# Patient Record
Sex: MALE | Race: WHITE | Employment: OTHER | URBAN - NONMETROPOLITAN AREA
[De-identification: names, ages, dates, MRNs, and addresses within clinical notes are randomized per-mention and may not be internally consistent; named-entity substitution may affect disease eponyms.]

---

## 2021-09-20 ENCOUNTER — OUTSIDE SERVICES (OUTPATIENT)
Dept: FAMILY MEDICINE CLINIC | Age: 81
End: 2021-09-20
Payer: MEDICARE

## 2021-09-20 VITALS
WEIGHT: 164.1 LBS | TEMPERATURE: 97.7 F | HEART RATE: 80 BPM | SYSTOLIC BLOOD PRESSURE: 125 MMHG | OXYGEN SATURATION: 97 % | DIASTOLIC BLOOD PRESSURE: 75 MMHG | RESPIRATION RATE: 16 BRPM

## 2021-09-20 DIAGNOSIS — I10 ESSENTIAL HYPERTENSION, BENIGN: ICD-10-CM

## 2021-09-20 DIAGNOSIS — K21.00 GASTROESOPHAGEAL REFLUX DISEASE WITH ESOPHAGITIS WITHOUT HEMORRHAGE: ICD-10-CM

## 2021-09-20 DIAGNOSIS — N40.1 BENIGN PROSTATIC HYPERPLASIA WITH LOWER URINARY TRACT SYMPTOMS, SYMPTOM DETAILS UNSPECIFIED: ICD-10-CM

## 2021-09-20 DIAGNOSIS — S72.332D CLOSED DISPLACED OBLIQUE FRACTURE OF SHAFT OF LEFT FEMUR WITH ROUTINE HEALING: Primary | ICD-10-CM

## 2021-09-20 DIAGNOSIS — E78.2 MIXED HYPERLIPIDEMIA: ICD-10-CM

## 2021-09-20 DIAGNOSIS — I48.20 CHRONIC ATRIAL FIBRILLATION (HCC): ICD-10-CM

## 2021-09-20 PROCEDURE — 99310 SBSQ NF CARE HIGH MDM 45: CPT | Performed by: NURSE PRACTITIONER

## 2021-09-20 ASSESSMENT — ENCOUNTER SYMPTOMS
COUGH: 0
CONSTIPATION: 0
WHEEZING: 0
EYE REDNESS: 0
VOMITING: 0
SORE THROAT: 0
SHORTNESS OF BREATH: 0
RHINORRHEA: 0
DIARRHEA: 0
NAUSEA: 0

## 2021-09-20 NOTE — PROGRESS NOTES
Babs Lemus is a 79 y.o. male who presents today for his medical conditions/complaints as noted below. Chief Complaint   Patient presents with    Other     Medication review           HPI:     Patient seen and examined today for medication review. Patient is an 65-year-old male who was admitted on 8/25/2021 after he fell and injured his left hip. He fell on his left side and complained of immediate pain he presented to the emergency department and was noted to have a left femoral neck subcapital fracture. Dr. Fatemeh Dumas was consulted for surgical intervention and he underwent a left total hip replacement on 8/27/2021. Postoperatively he is very soft-spoken and has not felt well per hospital records. CT of the head was completed on 8/28/2021 and revealed no acute intracranial hemorrhage or mass-effect. An abdominal x-ray was completed on 8/29/2021 and showed fecal retention. He did transfer to the inpatient rehab on 8/31/2021 and then came to the facility on 9/17/2021 for additional therapy. Patient has past medical history of atrial fib, hypertension, hyperlipidemia, diverticulitis, history of kidney stones, osteoarthritis, bilateral rotator cuffs, postoperative anemia. Patient's cardiac meds were changed in the hospital secondary to not being able to tolerate amiodarone. Patient was switched to Multaq and remained on metoprolol and Cardizem. Patient states he still does not feel well and does have a follow-up with cardiology on 9/23/2021. Patient also states that he does not want to remain on Eliquis as this is very expensive. Will have patient discuss with cardiology as he has been on a full aspirin in the past. Patient's heart rate is regular and rate controlled currently. Blood pressure is minimally low and will add parameters for metoprolol and Cardizem along with Multaq.       Past Medical History:   Diagnosis Date    Atrial fibrillation ([facility] [address]     Bilateral rotator cuff dysfunction     Diverticulitis     Essential hypertension     Kidney stones     Mixed hyperlipidemia     Osteoarthritis     UTI (urinary tract infection)       Past Surgical History:   Procedure Laterality Date    CARDIOVASCULAR STRESS TEST      COLONOSCOPY      FEMUR CLOSED REDUCTION Left     KIDNEY STONE REMOVAL      TONSILLECTOMY         History reviewed. No pertinent family history. Social History     Tobacco Use    Smoking status: Former Smoker    Smokeless tobacco: Never Used   Substance Use Topics    Alcohol use: Never      Allergies   Allergen Reactions    Amiodarone     Sulfa Antibiotics        Health Maintenance   Topic Date Due    COVID-19 Vaccine (1) Never done    Flu vaccine (1) Never done       Subjective:      Review of Systems   Constitutional: Positive for fatigue (malaise). Negative for chills and fever. HENT: Negative for congestion, rhinorrhea and sore throat. Eyes: Positive for visual disturbance. Negative for redness. Respiratory: Negative for cough, shortness of breath and wheezing. Cardiovascular: Positive for leg swelling (left foot). Negative for chest pain. Gastrointestinal: Negative for constipation, diarrhea, nausea and vomiting. Endocrine: Negative for polydipsia and polyuria. Genitourinary: Negative for dysuria, frequency and hematuria. Musculoskeletal: Positive for gait problem. Negative for arthralgias and myalgias. Skin: Positive for wound (left hip ). Negative for rash. Allergic/Immunologic: Negative for environmental allergies and immunocompromised state. Neurological: Positive for weakness. Negative for dizziness, light-headedness and headaches. Hematological: Does not bruise/bleed easily. Psychiatric/Behavioral: Negative for dysphoric mood and sleep disturbance. The patient is not nervous/anxious. Objective:     Physical Exam  Vitals and nursing note reviewed. Constitutional:       General: He is not in acute distress.      Appearance: He is well-developed. HENT:      Head: Normocephalic and atraumatic. Right Ear: External ear normal.      Left Ear: External ear normal.      Nose: Nose normal.   Eyes:      General: No scleral icterus. Right eye: No discharge. Left eye: No discharge. Conjunctiva/sclera: Conjunctivae normal.   Neck:      Thyroid: No thyromegaly. Vascular: No JVD. Cardiovascular:      Rate and Rhythm: Normal rate and regular rhythm. Heart sounds: Normal heart sounds. Pulmonary:      Effort: Pulmonary effort is normal. No respiratory distress. Breath sounds: Normal breath sounds. No stridor. No wheezing or rales. Abdominal:      General: Bowel sounds are normal. There is no distension. Palpations: Abdomen is soft. Tenderness: There is no abdominal tenderness. Musculoskeletal:         General: No deformity. Normal range of motion. Right shoulder: No tenderness or bony tenderness. Left shoulder: No tenderness or bony tenderness. Cervical back: Neck supple. No tenderness or bony tenderness. Thoracic back: No spasms, tenderness or bony tenderness. Lumbar back: No tenderness or bony tenderness. Left lower leg: Edema present. Lymphadenopathy:      Cervical: No cervical adenopathy. Upper Body:      Right upper body: No supraclavicular adenopathy. Left upper body: No supraclavicular adenopathy. Skin:     General: Skin is warm and dry. Findings: Wound (left hip incision) present. No erythema or rash. Neurological:      Mental Status: He is alert and oriented to person, place, and time. Motor: No atrophy, abnormal muscle tone or seizure activity. Psychiatric:         Speech: Speech normal.         Behavior: Behavior normal.       /75   Pulse 80   Temp 97.7 °F (36.5 °C)   Resp 16   Wt 164 lb 1.6 oz (74.4 kg)   SpO2 97%     Assessment/Plan      1.  Closed displaced oblique fracture of shaft of left femur with routine healing -PT eval and treat. Continue pain control with Tylenol patient states he is not taking Norco. We will continue to monitor. Follow-up with orthopedics as directed. 2. Essential hypertension, benign -on metoprolol and Cardizem. We will add parameters. Blood pressure mildly low. 3. Gastroesophageal reflux disease with esophagitis without hemorrhage -chronic and states stomach has been upset since taking Cordarone but recently been on antibiotics for UTI which completed yesterday. We will continue Phenergan and pantoprazole. 4. Mixed hyperlipidemia -chronic and continue aspirin, Eliquis, heart healthy diet. 5. Benign prostatic hyperplasia with lower urinary tract symptoms, symptom details unspecified -chronic and continue Flomax. Recent UTI with antibiotics. 6. Chronic atrial fibrillation (HCC) -chronic and follows with cardiology. Has follow-up on 9/23/2021. Continued diltiazem, metoprolol, Multaq. We will add parameters. Deana on Eliquis and aspirin. Will discuss with cardiology continuation of Eliquis or possibly switching to Coumadin. Patient seen and examined. History partially obtained by chart review and nursing notes. I have reviewed patient's past medical, surgical, social, and family history and have made updates where appropriate.   See facility EMR for updated medication list.       Electronically signed by CELSO Carrera CNP on 9/20/2021 at 11:18 AM

## 2021-10-08 ENCOUNTER — OUTSIDE SERVICES (OUTPATIENT)
Dept: FAMILY MEDICINE CLINIC | Age: 81
End: 2021-10-08
Payer: MEDICARE

## 2021-10-08 VITALS
OXYGEN SATURATION: 95 % | SYSTOLIC BLOOD PRESSURE: 104 MMHG | TEMPERATURE: 98.1 F | DIASTOLIC BLOOD PRESSURE: 61 MMHG | HEART RATE: 62 BPM | WEIGHT: 160.4 LBS | RESPIRATION RATE: 16 BRPM

## 2021-10-08 DIAGNOSIS — K21.00 GASTROESOPHAGEAL REFLUX DISEASE WITH ESOPHAGITIS WITHOUT HEMORRHAGE: ICD-10-CM

## 2021-10-08 DIAGNOSIS — S72.332D CLOSED DISPLACED OBLIQUE FRACTURE OF SHAFT OF LEFT FEMUR WITH ROUTINE HEALING: Primary | ICD-10-CM

## 2021-10-08 DIAGNOSIS — I48.20 CHRONIC ATRIAL FIBRILLATION (HCC): ICD-10-CM

## 2021-10-08 DIAGNOSIS — E78.2 MIXED HYPERLIPIDEMIA: ICD-10-CM

## 2021-10-08 DIAGNOSIS — I10 ESSENTIAL HYPERTENSION, BENIGN: ICD-10-CM

## 2021-10-08 DIAGNOSIS — N40.1 BENIGN PROSTATIC HYPERPLASIA WITH LOWER URINARY TRACT SYMPTOMS, SYMPTOM DETAILS UNSPECIFIED: ICD-10-CM

## 2021-10-08 PROCEDURE — 99490 CHRNC CARE MGMT STAFF 1ST 20: CPT | Performed by: FAMILY MEDICINE

## 2021-10-08 PROCEDURE — 99306 1ST NF CARE HIGH MDM 50: CPT | Performed by: FAMILY MEDICINE

## 2021-10-08 ASSESSMENT — ENCOUNTER SYMPTOMS
DIARRHEA: 0
RHINORRHEA: 0
CONSTIPATION: 0
VOICE CHANGE: 1
EYE REDNESS: 0
WHEEZING: 0
COUGH: 0
VOMITING: 0
SORE THROAT: 0
SHORTNESS OF BREATH: 0
NAUSEA: 0

## 2021-10-08 NOTE — PROGRESS NOTES
Keyonna Scott is a 79 y.o. male who presents today for his medical conditions/complaints as noted below. Chief Complaint   Patient presents with    Other     Admission to the facility           HPI:     HPI  Patient is seen and examined today in the room for admission to the facility. He was initially admitted to the hospital on 8-25-21 after a fall where he sustained a left femur fracture and underwent a left total hip replacement on 8-27-21. He did well postoperatively and was obtaining inpatient rehab for 2 weeks and then came to this facility on 9-17-21 for additional therapy. Today he states he is feeling fairly well. He is working with therapy and believes he is getting stronger. He does have a goal to go home. He has a past medical history significant for A. fib, hypertension, hyperlipidemia, osteoarthritis, postoperative anemia. Past Medical History:   Diagnosis Date    Atrial fibrillation (HCC)     Bilateral rotator cuff dysfunction     Diverticulitis     Essential hypertension     Kidney stones     Mixed hyperlipidemia     Osteoarthritis     UTI (urinary tract infection)       Past Surgical History:   Procedure Laterality Date    CARDIOVASCULAR STRESS TEST      COLONOSCOPY      FEMUR CLOSED REDUCTION Left     KIDNEY STONE REMOVAL      TONSILLECTOMY         No family history on file.     Social History     Tobacco Use    Smoking status: Former Smoker    Smokeless tobacco: Never Used   Substance Use Topics    Alcohol use: Never      Allergies   Allergen Reactions    Amiodarone     Sulfa Antibiotics        Health Maintenance   Topic Date Due    COVID-19 Vaccine (1) Never done    Shingles Vaccine (1 of 2) Never done    Pneumococcal 65+ years Vaccine (1 of 1 - PPSV23) Never done    Flu vaccine (1) Never done    DTaP/Tdap/Td vaccine (2 - Td or Tdap) 06/16/2031    Hepatitis A vaccine  Aged Out    Hepatitis B vaccine  Aged Out    Hib vaccine  Aged C/ [address] 19 Meningococcal (ACWY) vaccine  Aged Out       Subjective:      Review of Systems   Constitutional: Positive for activity change and appetite change (improved slightly). Negative for chills, fatigue and fever. HENT: Positive for voice change (hoarse voice, worse when talking a lot). Negative for congestion, rhinorrhea and sore throat. Eyes: Negative for redness and visual disturbance. Respiratory: Negative for cough, shortness of breath and wheezing. Cardiovascular: Positive for leg swelling (mild). Negative for chest pain. Gastrointestinal: Negative for constipation, diarrhea, nausea and vomiting. Endocrine: Negative for polydipsia and polyuria. Genitourinary: Negative for dysuria, frequency and hematuria. Musculoskeletal: Positive for arthralgias and gait problem. Negative for myalgias. Skin: Positive for wound (left hip). Negative for rash. Allergic/Immunologic: Negative for environmental allergies and immunocompromised state. Neurological: Positive for weakness (generalized). Negative for dizziness, light-headedness and headaches. Hematological: Does not bruise/bleed easily. Psychiatric/Behavioral: Negative for decreased concentration, dysphoric mood and sleep disturbance. The patient is not nervous/anxious. Objective:     Physical Exam  Vitals and nursing note reviewed. Constitutional:       General: He is not in acute distress. Appearance: He is well-developed. HENT:      Head: Normocephalic and atraumatic. Right Ear: External ear normal.      Left Ear: External ear normal.      Nose: Nose normal.   Eyes:      General: No scleral icterus. Right eye: No discharge. Left eye: No discharge. Conjunctiva/sclera: Conjunctivae normal.   Neck:      Thyroid: No thyromegaly. Vascular: No JVD. Cardiovascular:      Rate and Rhythm: Normal rate and regular rhythm. Heart sounds: Normal heart sounds.    Pulmonary:      Effort: Pulmonary effort is normal. No respiratory distress. Breath sounds: Normal breath sounds. No stridor. No wheezing or rales. Abdominal:      General: Bowel sounds are normal. There is no distension. Palpations: Abdomen is soft. Tenderness: There is no abdominal tenderness. Musculoskeletal:         General: No deformity. Normal range of motion. Right shoulder: No tenderness or bony tenderness. Left shoulder: No tenderness or bony tenderness. Cervical back: Neck supple. No tenderness or bony tenderness. Thoracic back: No spasms, tenderness or bony tenderness. Lumbar back: No tenderness or bony tenderness. Right lower leg: Edema (trace) present. Left lower leg: Edema (trace) present. Lymphadenopathy:      Cervical: No cervical adenopathy. Upper Body:      Right upper body: No supraclavicular adenopathy. Left upper body: No supraclavicular adenopathy. Skin:     General: Skin is warm and dry. Findings: No erythema or rash. Neurological:      Mental Status: He is alert and oriented to person, place, and time. Mental status is at baseline. Motor: Weakness (generalized) present. No atrophy, abnormal muscle tone or seizure activity. Psychiatric:         Mood and Affect: Mood normal.         Speech: Speech normal.         Behavior: Behavior normal.         Thought Content: Thought content normal.         Judgment: Judgment normal.       /61   Pulse 62   Temp 98.1 °F (36.7 °C)   Resp 16   Wt 160 lb 6.4 oz (72.8 kg)   SpO2 95%     Assessment/Plan      1. Closed displaced oblique fracture of shaft of left femur with routine healing    2. Essential hypertension, benign    3. Gastroesophageal reflux disease with esophagitis without hemorrhage    4. Mixed hyperlipidemia    5. Benign prostatic hyperplasia with lower urinary tract symptoms, symptom details unspecified    6. Chronic atrial fibrillation ([facility] [address]      1. Therapy to eval and treat.   Continue with pain control and follow-up with Ortho  2. Chronic and overall controlled. Continue metoprolol and Cardizem. Monitor for lows. Parameters in place. 3.  No longer having nausea. Continue Protonix daily and has Phenergan as needed. 4.  Continue aspirin, and heart healthy diet. Follows with cardiology. 5.  Continue Flomax. Monitor for signs and symptoms of infection. 6.  Rate controlled with Cardizem and metoprolol. Continue this as well as Multaq and Eliquis. Follows up with cardiology. Consider switching to Coumadin as an outpatient if cost is an issue. Resident has a. Fib, HLD, HTN and it is expected to last 12 or more months. These chronic conditions place resident at a significant risk of death, acute exacerbation, or functional decline. The patient's comprehensive plan was monitored today. I spent 20 minutes reviewing plan. Patient seen and examined. History partially obtained by chart review and nursing notes. I have reviewed patient's past medical, surgical, social, and family history and have made updates where appropriate.   See facility EMR for updated medication list.       Electronically signed by Erlin Levin MD on 10/8/2021 at 12:10 PM

## 2021-10-25 ENCOUNTER — OUTSIDE SERVICES (OUTPATIENT)
Dept: FAMILY MEDICINE CLINIC | Age: 81
End: 2021-10-25
Payer: MEDICARE

## 2021-10-25 VITALS
OXYGEN SATURATION: 95 % | WEIGHT: 162.8 LBS | SYSTOLIC BLOOD PRESSURE: 105 MMHG | RESPIRATION RATE: 18 BRPM | DIASTOLIC BLOOD PRESSURE: 55 MMHG | HEART RATE: 60 BPM | TEMPERATURE: 97.7 F

## 2021-10-25 DIAGNOSIS — J02.9 ACUTE PHARYNGITIS, UNSPECIFIED ETIOLOGY: Primary | ICD-10-CM

## 2021-10-25 PROCEDURE — 99308 SBSQ NF CARE LOW MDM 20: CPT | Performed by: NURSE PRACTITIONER

## 2021-10-25 ASSESSMENT — ENCOUNTER SYMPTOMS
DIARRHEA: 0
SINUS PAIN: 0
SORE THROAT: 1
TROUBLE SWALLOWING: 1
VOMITING: 0
NAUSEA: 0
RHINORRHEA: 0
WHEEZING: 0
SINUS PRESSURE: 0
CONSTIPATION: 0
SHORTNESS OF BREATH: 0
COUGH: 0

## 2021-10-25 NOTE — PROGRESS NOTES
Keyonna Scott is a 79 y.o. male who presents today for his medical conditions/complaints as noted below. Chief Complaint   Patient presents with    Pharyngitis           HPI:     Is seen and examined in his room today for complaints of sore throat. He was noted to have sore throat last week and has continued into this week. He is afebrile but states this increases throughout the day. Denies any maxillary or frontal sinus congestion. He is Covid negative. He has erythema to his bilateral tonsils with mild exudate noted into pharynx. No current outpatient medications on file. No current facility-administered medications for this visit. Allergies   Allergen Reactions    Amiodarone     Sulfa Antibiotics        Subjective:      Review of Systems   Constitutional: Negative for chills and fever. HENT: Positive for hearing loss, postnasal drip, sore throat and trouble swallowing. Negative for congestion, rhinorrhea, sinus pressure, sinus pain and sneezing. Respiratory: Negative for cough, shortness of breath and wheezing. Cardiovascular: Negative for chest pain and leg swelling. Gastrointestinal: Negative for constipation, diarrhea, nausea and vomiting. Genitourinary: Negative for dysuria, frequency and urgency. Musculoskeletal: Negative for arthralgias and myalgias. Skin: Negative for pallor and rash. Objective:     BP (!) 105/55   Pulse 60   Temp 97.7 °F (36.5 °C)   Resp 18   Wt 162 lb 12.8 oz (73.8 kg)   SpO2 95%     Physical Exam  Vitals and nursing note reviewed. Constitutional:       General: He is not in acute distress. Appearance: He is well-developed. HENT:      Head: Normocephalic and atraumatic. Nose: Rhinorrhea present. Mouth/Throat:      Pharynx: Oropharyngeal exudate and posterior oropharyngeal erythema present. No uvula swelling. Tonsils: 2+ on the right. 2+ on the left. Eyes:      General: No scleral icterus.         Right eye: No discharge. Left eye: No discharge. Cardiovascular:      Rate and Rhythm: Normal rate and regular rhythm. Pulmonary:      Effort: Pulmonary effort is normal. No respiratory distress. Breath sounds: Normal breath sounds. No wheezing. Abdominal:      General: Bowel sounds are normal. There is no distension. Palpations: Abdomen is soft. Tenderness: There is no abdominal tenderness. Musculoskeletal:         General: No deformity. Lymphadenopathy:      Head:      Right side of head: Tonsillar adenopathy present. Left side of head: Tonsillar adenopathy present. Cervical: Cervical adenopathy present. Right cervical: Superficial cervical adenopathy present. Skin:     General: Skin is warm and dry. Neurological:      Mental Status: He is alert and oriented to person, place, and time. Assessment/Plan      1. Acute pharyngitis, unspecified etiology    Treat with amoxicillin 500 mg twice daily x10 days.     Electronically signed by CELSO Moirn CNP on 10/25/2021 at 1:41 PM

## 2021-11-11 ENCOUNTER — OUTSIDE SERVICES (OUTPATIENT)
Dept: FAMILY MEDICINE CLINIC | Age: 81
End: 2021-11-11
Payer: MEDICARE

## 2021-11-11 VITALS
RESPIRATION RATE: 16 BRPM | HEART RATE: 94 BPM | WEIGHT: 167 LBS | DIASTOLIC BLOOD PRESSURE: 77 MMHG | TEMPERATURE: 98 F | SYSTOLIC BLOOD PRESSURE: 132 MMHG | OXYGEN SATURATION: 92 %

## 2021-11-11 DIAGNOSIS — I10 ESSENTIAL HYPERTENSION, BENIGN: ICD-10-CM

## 2021-11-11 DIAGNOSIS — S72.332D CLOSED DISPLACED OBLIQUE FRACTURE OF SHAFT OF LEFT FEMUR WITH ROUTINE HEALING: Primary | ICD-10-CM

## 2021-11-11 DIAGNOSIS — E78.2 MIXED HYPERLIPIDEMIA: ICD-10-CM

## 2021-11-11 DIAGNOSIS — I48.20 CHRONIC ATRIAL FIBRILLATION (HCC): ICD-10-CM

## 2021-11-11 DIAGNOSIS — K21.00 GASTROESOPHAGEAL REFLUX DISEASE WITH ESOPHAGITIS WITHOUT HEMORRHAGE: ICD-10-CM

## 2021-11-11 DIAGNOSIS — N40.1 BENIGN PROSTATIC HYPERPLASIA WITH LOWER URINARY TRACT SYMPTOMS, SYMPTOM DETAILS UNSPECIFIED: ICD-10-CM

## 2021-11-11 PROCEDURE — 99309 SBSQ NF CARE MODERATE MDM 30: CPT | Performed by: NURSE PRACTITIONER

## 2021-11-11 PROCEDURE — 99490 CHRNC CARE MGMT STAFF 1ST 20: CPT | Performed by: NURSE PRACTITIONER

## 2021-11-11 ASSESSMENT — ENCOUNTER SYMPTOMS
COUGH: 0
WHEEZING: 0
RHINORRHEA: 0
NAUSEA: 0
SORE THROAT: 0
CONSTIPATION: 0
VOMITING: 0
SHORTNESS OF BREATH: 0
DIARRHEA: 0
EYE REDNESS: 0

## 2021-11-11 NOTE — PROGRESS NOTES
Gatito Clinton is a 79 y.o. male who presents today for his medical conditions/complaints as noted below. Chief Complaint   Patient presents with    1 Month Follow-Up     Follow up on chronic health conditions           HPI:     Patient seen and examined today for follow up on chronic health conditions. He has been here for rehab after a left femur fracture. He has been undergoing rehab. He was recently treated for pharyngitis. He has chronic health conditions of HTN, GERD, atrial fib, HLD, and BPH. His weight has increased. He has had no recent falls. Today he is seen in his room and states he is doing well. He denies pain, shortness of breath or chest pain. He does have an appointment with Dr. Lui Albert today and will discuss his upcoming ablation. He is concerned about the cost of his medications which will likely be over 1000.00 per month. He also complains of copper taste in his mouth. Will check a vitamin levels and monitor. Past Medical History:   Diagnosis Date    Atrial fibrillation (HCC)     Bilateral rotator cuff dysfunction     Diverticulitis     Essential hypertension     Kidney stones     Mixed hyperlipidemia     Osteoarthritis     UTI (urinary tract infection)       Past Surgical History:   Procedure Laterality Date    CARDIOVASCULAR STRESS TEST      COLONOSCOPY      FEMUR CLOSED REDUCTION Left     KIDNEY STONE REMOVAL      TONSILLECTOMY         No family history on file.     Social History     Tobacco Use    Smoking status: Former Smoker    Smokeless tobacco: Never Used   Substance Use Topics    Alcohol use: Never      Allergies   Allergen Reactions    Amiodarone     Sulfa Antibiotics        Health Maintenance   Topic Date Due    COVID-19 Vaccine (1) Never done    Shingles Vaccine (1 of 2) Never done    Pneumococcal 65+ years Vaccine (1 of 1 - PPSV23) Never done    Flu vaccine (1) Never done    DTaP/Tdap/Td vaccine (2 - Td or Tdap) 06/16/2031    Hepatitis A vaccine Aged Out    Hepatitis B vaccine  Aged Out    Hib vaccine  Aged Out    Meningococcal (ACWY) vaccine  Aged Out       Subjective:      Review of Systems   Constitutional: Negative for chills, fatigue and fever. HENT: Negative for congestion, rhinorrhea and sore throat. Metal taste in mouth   Eyes: Negative for redness and visual disturbance. Respiratory: Negative for cough, shortness of breath and wheezing. Cardiovascular: Negative for chest pain and leg swelling. Gastrointestinal: Negative for constipation, diarrhea, nausea and vomiting. Endocrine: Negative for polydipsia and polyuria. Genitourinary: Negative for dysuria, frequency and hematuria. Musculoskeletal: Positive for arthralgias and gait problem. Negative for myalgias. Skin: Negative for rash and wound. Allergic/Immunologic: Negative for environmental allergies and immunocompromised state. Neurological: Negative for dizziness, light-headedness and headaches. Hematological: Bruises/bleeds easily. Psychiatric/Behavioral: Negative for dysphoric mood and sleep disturbance. The patient is not nervous/anxious. Objective:     Physical Exam  Vitals and nursing note reviewed. Constitutional:       General: He is not in acute distress. Appearance: He is well-developed. HENT:      Head: Normocephalic and atraumatic. Right Ear: External ear normal.      Left Ear: External ear normal.      Nose: Nose normal.   Eyes:      General: No scleral icterus. Right eye: No discharge. Left eye: No discharge. Conjunctiva/sclera: Conjunctivae normal.   Neck:      Thyroid: No thyromegaly. Vascular: No JVD. Cardiovascular:      Rate and Rhythm: Normal rate and regular rhythm. Heart sounds: Normal heart sounds. Pulmonary:      Effort: Pulmonary effort is normal. No respiratory distress. Breath sounds: Normal breath sounds. No stridor. No wheezing or rales.    Chest:   Breasts:      Right: No supraclavicular adenopathy. Left: No supraclavicular adenopathy. Abdominal:      General: Bowel sounds are normal. There is no distension. Palpations: Abdomen is soft. Tenderness: There is no abdominal tenderness. Musculoskeletal:         General: No deformity. Normal range of motion. Right shoulder: No tenderness or bony tenderness. Left shoulder: No tenderness or bony tenderness. Cervical back: Neck supple. No tenderness or bony tenderness. Thoracic back: No spasms, tenderness or bony tenderness. Lumbar back: No tenderness or bony tenderness. Lymphadenopathy:      Cervical: No cervical adenopathy. Upper Body:      Right upper body: No supraclavicular adenopathy. Left upper body: No supraclavicular adenopathy. Skin:     General: Skin is warm and dry. Findings: No erythema or rash. Neurological:      Mental Status: He is alert and oriented to person, place, and time. Motor: No atrophy, abnormal muscle tone or seizure activity. Psychiatric:         Speech: Speech normal.         Behavior: Behavior normal.       /77   Pulse 94   Temp 98 °F (36.7 °C)   Resp 16   Wt 167 lb (75.8 kg)   SpO2 92%     Assessment/Plan      1. Closed displaced oblique fracture of shaft of left femur with routine healing - Will dc Norco, continue Tylenol and therapy. Discharge anticipated next week. Monitor for falls. 2. Essential hypertension, benign - Blood pressures stable. Continue Metoprolol, Cardizem and monitor. Remains on Midodrine. 3. Gastroesophageal reflux disease with esophagitis without hemorrhage - Chronic and continue Protonix. Appetite improved. Weight increased. Will check B12, B6 and Zinc level   4. Mixed hyperlipidemia - Chronic and continue ASA, and labs reviewed. Overall stable. 5. Benign prostatic hyperplasia with lower urinary tract symptoms, symptom details unspecified - Chronic and continue Flomax.     6. Chronic atrial fibrillation ([facility] [address] - Follow up with Dr. Sahara Jennings today. Continue Metoprolol, Cardizem, Multaq and Eliquis. Will send note to cardio regarding cost of meds. Resident has HTN, HLD, atrial fib and it is expected to last 12 or more months. These chronic conditions place resident at a significant risk of death, acute exacerbation, or functional decline. The patient's comprehensive plan was monitored today. I spent 20 minutes reviewing plan. Patient seen and examined. History partially obtained by chart review and nursing notes. I have reviewed patient's past medical, surgical, social, and family history and have made updates where appropriate.   See facility EMR for updated medication list.       Electronically signed by CELSO Holliday CNP on 11/11/2021 at 8:55 AM

## 2022-02-10 PROBLEM — K21.00 GASTROESOPHAGEAL REFLUX DISEASE WITH ESOPHAGITIS WITHOUT HEMORRHAGE: Status: ACTIVE | Noted: 2022-02-10

## 2022-02-10 PROBLEM — I10 ESSENTIAL HYPERTENSION, BENIGN: Status: ACTIVE | Noted: 2022-02-10

## 2022-02-10 PROBLEM — S43.004A DISLOCATION OF RIGHT SHOULDER JOINT: Status: ACTIVE | Noted: 2022-02-10

## 2022-02-10 PROBLEM — N40.1 BENIGN PROSTATIC HYPERPLASIA WITH LOWER URINARY TRACT SYMPTOMS: Status: ACTIVE | Noted: 2022-02-10

## 2022-02-10 PROBLEM — Z86.16 HISTORY OF COVID-19: Status: ACTIVE | Noted: 2022-02-10

## 2022-02-10 PROBLEM — I48.20 CHRONIC ATRIAL FIBRILLATION (HCC): Status: ACTIVE | Noted: 2022-02-10

## 2022-02-10 PROBLEM — E78.2 MIXED HYPERLIPIDEMIA: Status: ACTIVE | Noted: 2022-02-10

## 2024-05-12 ENCOUNTER — APPOINTMENT (OUTPATIENT)
Dept: GENERAL RADIOLOGY | Age: 84
End: 2024-05-12
Payer: MEDICARE

## 2024-05-12 ENCOUNTER — HOSPITAL ENCOUNTER (EMERGENCY)
Age: 84
Discharge: HOME | End: 2024-05-12
Payer: MEDICARE

## 2024-05-12 VITALS
SYSTOLIC BLOOD PRESSURE: 103 MMHG | WEIGHT: 160 LBS | TEMPERATURE: 97.8 F | OXYGEN SATURATION: 96 % | DIASTOLIC BLOOD PRESSURE: 67 MMHG | HEART RATE: 73 BPM | RESPIRATION RATE: 16 BRPM

## 2024-05-12 DIAGNOSIS — M25.551 RIGHT HIP PAIN: ICD-10-CM
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
## 2024-05-12 PROCEDURE — 99212 OFFICE O/P EST SF 10 MIN: CPT

## 2024-05-12 PROCEDURE — 99205 OFFICE O/P NEW HI 60 MIN: CPT

## 2024-05-12 PROCEDURE — 73502 X-RAY EXAM HIP UNI 2-3 VIEWS: CPT

## 2024-05-12 RX ORDER — ALLOPURINOL 300 MG/1
300 TABLET ORAL DAILY
COMMUNITY
Start: 2020-08-18

## 2024-05-12 RX ORDER — LATANOPROST 50 UG/ML
SOLUTION/ DROPS OPHTHALMIC
COMMUNITY
Start: 2024-04-22

## 2024-05-12 RX ORDER — MIRTAZAPINE 7.5 MG/1
7.5 TABLET, FILM COATED ORAL NIGHTLY
COMMUNITY

## 2024-05-12 ASSESSMENT — PAIN DESCRIPTION - ORIENTATION: ORIENTATION: RIGHT

## 2024-05-12 ASSESSMENT — PAIN - FUNCTIONAL ASSESSMENT
PAIN_FUNCTIONAL_ASSESSMENT: 0-10
PAIN_FUNCTIONAL_ASSESSMENT: PREVENTS OR INTERFERES WITH MANY ACTIVE NOT PASSIVE ACTIVITIES

## 2024-05-12 ASSESSMENT — PAIN DESCRIPTION - FREQUENCY: FREQUENCY: INTERMITTENT

## 2024-05-12 ASSESSMENT — PAIN DESCRIPTION - LOCATION: LOCATION: HIP

## 2024-05-12 ASSESSMENT — PAIN SCALES - GENERAL: PAINLEVEL_OUTOF10: 8

## 2024-05-12 ASSESSMENT — PAIN DESCRIPTION - PAIN TYPE: TYPE: ACUTE PAIN

## 2024-05-12 NOTE — ED TRIAGE NOTES
Eb arrives to room with complaint of  fall onto cement yesterday while picking up a pen, now with right knee and hip pain      Seen at [facility] yesterday, xray of knee and elbow was negative for break.      Right hip began to hurt last night after getting back to his home last night.      Taking tylenol last dose at 10:30 am today.

## 2024-05-12 NOTE — ED PROVIDER NOTES
[facility] URGENT CARE      URGENT CARE     Pt Name: Eb Anderson  MRN: [id number]  Birthdate 1940  Date of evaluation: 5/12/2024  Provider: CELSO Randle CNP    Urgent Care Encounter     CHIEF COMPLAINT       Chief Complaint   Patient presents with    Hip Pain     HISTORY OF PRESENT ILLNESS   Eb Anderson is a 83 y.o. male who presents to urgent care with chief complaint of right hip pain.  Started today.  Was evaluated at [facility] emergency department yesterday status post fall onto concrete.  He was reaching down to reach for a pen and lost his footing landing predominantly on right knee/elbow.  Had plain films of knee and elbow yesterday that were negative for any acute abnormality.  States he had slow onset right hip pain today.  Unfortunately unable to bear weight/walk.  Baseline is able to walk independently but does use a walker at times.  Comes in wheelchair today.  Denies any numbness or altered sensation.  Denies bruising swelling or erythema.  Denies history of hip surgery to the right hip or injury previously.    History obtained from patient and patient's family member    URGENT CARE TIMELINE      ED Course as of 05/12/24 1338   Sun May 12, 2024   1223 BP: 103/67  Vitals are stable, afebrile [JR]   1326 XR HIP 2-3 VW W PELVIS RIGHT  No acute fracture/malalignment but there is some radial opacification seemingly posterior to acetabulum that is unexplained.  Recommendations of CT scan to evaluate abnormality.    Discussed at length with patient and patient's family member who are agreeable to go to ER for potential CT scan of pelvis [JR]   1337 Called report to Dr. Rojas in [facility] emergency department who assumed care of patient stated is reasonable transfer.  Discussed HPI physical assessment findings and recommendations of CT scan.  All questions answered. [JR]      ED Course User Index  [JR] Victorino Gusman APRN - CNP

## 2024-11-24 PROBLEM — F32.1 CURRENT MODERATE EPISODE OF MAJOR DEPRESSIVE DISORDER WITHOUT PRIOR EPISODE (HCC): Status: ACTIVE | Noted: 2024-11-24

## 2024-11-25 ENCOUNTER — CARE COORDINATION (OUTPATIENT)
Dept: CARE COORDINATION | Age: 84
End: 2024-11-25

## 2024-11-25 NOTE — CARE COORDINATION
SECURE email notification sent to identified IDT members at   [facility]

## 2024-12-20 ENCOUNTER — CARE COORDINATION (OUTPATIENT)
Dept: CARE COORDINATION | Age: 84
End: 2024-12-20

## 2024-12-20 SDOH — SOCIAL STABILITY: SOCIAL NETWORK: ARE YOU MARRIED, WIDOWED, DIVORCED, SEPARATED, NEVER MARRIED, OR LIVING WITH A PARTNER?: WIDOWED

## 2024-12-20 SDOH — HEALTH STABILITY: MENTAL HEALTH
STRESS IS WHEN SOMEONE FEELS TENSE, NERVOUS, ANXIOUS, OR CAN'T SLEEP AT NIGHT BECAUSE THEIR MIND IS TROUBLED. HOW STRESSED ARE YOU?: ONLY A LITTLE

## 2024-12-20 SDOH — HEALTH STABILITY: MENTAL HEALTH: HOW OFTEN DO YOU HAVE A DRINK CONTAINING ALCOHOL?: NEVER

## 2024-12-20 SDOH — HEALTH STABILITY: PHYSICAL HEALTH: ON AVERAGE, HOW MANY DAYS PER WEEK DO YOU ENGAGE IN MODERATE TO STRENUOUS EXERCISE (LIKE A BRISK WALK)?: 0 DAYS

## 2024-12-20 SDOH — ECONOMIC STABILITY: INCOME INSECURITY: HOW HARD IS IT FOR YOU TO PAY FOR THE VERY BASICS LIKE FOOD, HOUSING, MEDICAL CARE, AND HEATING?: NOT HARD AT ALL

## 2024-12-20 SDOH — ECONOMIC STABILITY: FOOD INSECURITY: WITHIN THE PAST 12 MONTHS, YOU WORRIED THAT YOUR FOOD WOULD RUN OUT BEFORE YOU GOT MONEY TO BUY MORE.: NEVER TRUE

## 2024-12-20 SDOH — ECONOMIC STABILITY: INCOME INSECURITY: IN THE LAST 12 MONTHS, WAS THERE A TIME WHEN YOU WERE NOT ABLE TO PAY THE MORTGAGE OR RENT ON TIME?: NO

## 2024-12-20 SDOH — SOCIAL STABILITY: SOCIAL NETWORK
DO YOU BELONG TO ANY CLUBS OR ORGANIZATIONS SUCH AS CHURCH GROUPS UNIONS, FRATERNAL OR ATHLETIC GROUPS, OR SCHOOL GROUPS?: NO

## 2024-12-20 SDOH — ECONOMIC STABILITY: TRANSPORTATION INSECURITY
IN THE PAST 12 MONTHS, HAS THE LACK OF TRANSPORTATION KEPT YOU FROM MEDICAL APPOINTMENTS OR FROM GETTING MEDICATIONS?: NO

## 2024-12-20 SDOH — SOCIAL STABILITY: SOCIAL NETWORK: HOW OFTEN DO YOU GET TOGETHER WITH FRIENDS OR RELATIVES?: MORE THAN THREE TIMES A WEEK

## 2024-12-20 SDOH — SOCIAL STABILITY: SOCIAL NETWORK
IN A TYPICAL WEEK, HOW MANY TIMES DO YOU TALK ON THE PHONE WITH FAMILY, FRIENDS, OR NEIGHBORS?: MORE THAN THREE TIMES A WEEK

## 2024-12-20 SDOH — ECONOMIC STABILITY: TRANSPORTATION INSECURITY
IN THE PAST 12 MONTHS, HAS LACK OF TRANSPORTATION KEPT YOU FROM MEETINGS, WORK, OR FROM GETTING THINGS NEEDED FOR DAILY LIVING?: NO

## 2024-12-20 SDOH — HEALTH STABILITY: MENTAL HEALTH: HOW MANY STANDARD DRINKS CONTAINING ALCOHOL DO YOU HAVE ON A TYPICAL DAY?: PATIENT DOES NOT DRINK

## 2024-12-20 SDOH — ECONOMIC STABILITY: FOOD INSECURITY: WITHIN THE PAST 12 MONTHS, THE FOOD YOU BOUGHT JUST DIDN'T LAST AND YOU DIDN'T HAVE MONEY TO GET MORE.: NEVER TRUE

## 2024-12-20 SDOH — HEALTH STABILITY: PHYSICAL HEALTH: ON AVERAGE, HOW MANY MINUTES DO YOU ENGAGE IN EXERCISE AT THIS LEVEL?: 0 MIN

## 2024-12-20 NOTE — CARE COORDINATION
Ambulatory Care Coordination Note     2024 10:32 AM     Patient Current Location:  Home: [facility] [place] [address]     This patient was received as a referral from Care Transitions .    ACM contacted the patient by telephone. Verified name and  with patient as identifiers. Provided introduction to self, and explanation of the ACM role.   Patient accepted care management services at this time.          ACM: Zahraa Strickland RN     Challenges to be reviewed by the provider   Additional needs identified to be addressed with provider No  none               Method of communication with provider: none.    Utilization: N/A - Initial Call     Care Summary Note: Eb was referred to care coordination by PAC team following d/c from [facility].   Pt admitted to [facility]  to .  Pt has h/o: A-fib, HTN, depression, HLD, Watchman device.   Admitted to [facility] from -.   Pt has private duty caregiver that comes in 3x per wk.  Not currently receiving any other services.   Pt is very independent.  Takes care of his own meals and drives.   Pt feels he is doing well since SNF d/c.  No concerns voiced at this time.    Pt no longer on anticoagulant for a-fib d/t Watchman placement.  Follows with Dr. Singh.  Has appt   Has PCP appt   Has all of his prescribed medications.   Reviewed s/s of UTI.   Pt monitors BP at home prior to taking midodrine.  Has parameters on medication.  Reports SBP running 120-145 majority of the time.     Pt follows with [facility] providers.   Ambulating with rollator.     Offered patient enrollment in the Remote Patient Monitoring (RPM) program for in-home monitoring: Yes, but did not enroll at this time: already monitoring with home equipment.     Assessments Completed:   General Assessment    Do you have any symptoms that are causing concern?: Yes  Progression since Onset: Gradually Improving  Reported Symptoms: Weakness          Medications Reviewed:   Reviewed and

## 2024-12-27 ENCOUNTER — CARE COORDINATION (OUTPATIENT)
Dept: CARE COORDINATION | Age: 84
End: 2024-12-27

## 2024-12-27 NOTE — CARE COORDINATION
Attempted to reach patient for continued Care Coordination follow up and education.  Patient was unavailable at the time of my call and unable to leave a voicemail.

## 2025-01-03 ENCOUNTER — CARE COORDINATION (OUTPATIENT)
Dept: CARE COORDINATION | Age: 85
End: 2025-01-03

## 2025-01-03 NOTE — CARE COORDINATION
Explanation, Teachback  Response: Verbalizes Understanding, Needs Reinforcement    Educate Patient on When to Call for Symptoms, taught by Zahraa Strickland RN at 1/3/2025 12:10 PM.  Learner: Patient  Readiness: Acceptance  Method: Explanation, Teachback  Response: Verbalizes Understanding, Needs Reinforcement    Educate limitations or barriers to activity and/or exercise on discharge, taught by Zahraa Strickland RN at 1/3/2025 12:10 PM.  Learner: Patient  Readiness: Acceptance  Method: Explanation, Teachback  Response: Verbalizes Understanding, Needs Reinforcement    Education Comments  No comments found.     ,    Goals Addressed                   This Visit's Progress     Conditions and Symptoms   On track     I will schedule office visits, as directed by my provider.  I will keep my appointment or reschedule if I have to cancel.  I will notify my provider of any barriers to my plan of care.  I will notify my provider of any symptoms that indicate a worsening of my condition.    Barriers: lack of support  Plan for overcoming my barriers: education and support from PCP, ACM.   Confidence: 9/10  Anticipated Goal Completion Date: 3/20/25                 PCP/Specialist follow up:       Follow Up:   Plan for next ACM outreach in approximately 1 week to complete:  - CC Protocol assessments  - disease specific assessments  - goal progression  - education .   Patient  is agreeable to this plan.

## 2025-01-10 ENCOUNTER — CARE COORDINATION (OUTPATIENT)
Dept: CARE COORDINATION | Age: 85
End: 2025-01-10

## 2025-01-10 NOTE — CARE COORDINATION
Ambulatory Care Coordination Note  1/10/2025    Patient Current Location:  Home: [address] [address]     DONN DWYER contacted the patient by telephone. Verified name and  with patient as identifiers. Provided introduction to self, and explanation of the DONN DWYER role.     Challenges to be reviewed by the provider   Additional needs identified to be addressed with provider: No  none               Method of communication with provider: none.    I spoke with the patient for continued Care Coordination follow up and education.  Patient states he is doing good. Pt continues to receive private duty services 3 days per wk.  Riding stationary bike 20 min. Daily and doing resistance exercises with elastic band daily.  Upcoming PCP appointment.  Educated on how to identify sx's that are worse than the baseline and the importance of early symptom recognition and reporting to prevent exacerbation which may lead to ED visits and hospital admissions.  Patient did request two med refills.  I contacted PCP office and med refills were requested.  I advised patient to contact PCP office if needed.  No further needs at this time.       Lab Results       None            Care Coordination Interventions    Referral from Primary Care Provider: No  Suggested Interventions and Community Resources          Goals Addressed    None         No future appointments.

## 2025-01-27 ENCOUNTER — CARE COORDINATION (OUTPATIENT)
Dept: CARE COORDINATION | Age: 85
End: 2025-01-27

## 2025-01-27 NOTE — CARE COORDINATION
Ambulatory Care Coordination Note     1/27/2025 2:10 PM     Patient outreach attempt by this ACM today to perform care management follow up . ACM was unable to reach the patient by telephone today;   Pt answered but not able to talk as he is currently at eye dr.      ACM: Zahraa Strickland, RN     Care Summary Note: Eb was referred to care coordination by PAC team following d/c from [facility].   Pt admitted to [facility] 11/19 to 11/25.  Pt has h/o: A-fib, HTN, depression, HLD, Watchman device.   Admitted to [facility] 11/25-12/12.   Pt continues to receive private duty services 3 days per wk.     PCP/Specialist follow up:       Follow Up:   Plan for next ACM outreach in approximately 1 week to complete:  - CC Protocol assessments  - disease specific assessments  - goal progression  - education .

## 2025-01-31 ENCOUNTER — CARE COORDINATION (OUTPATIENT)
Dept: CARE COORDINATION | Age: 85
End: 2025-01-31

## 2025-01-31 NOTE — CARE COORDINATION
Ambulatory Care Coordination Note  2025    Patient Current Location:  Home: [id number] [place] [address]     DONN DWYER contacted the patient by telephone. Verified name and  with patient as identifiers. Provided introduction to self, and explanation of the DONN DWEYR role.     Challenges to be reviewed by the provider   Additional needs identified to be addressed with provider: No  none               Method of communication with provider: none.    I spoke with the patient for continued Care Coordination follow up and education.  Pt feels he is getting along fine at home. Riding stationary bike 20 min. Daily and doing resistance exercises with elastic band daily.  Pt continues to monitor BP at home twice daily.  Reports SBP has been staying under 140.  Patient completed PCP appointment and denies any concerns or issues following appointment. Pt continues to receive private duty services 3 days per wk.  Educated on how to identify sx's that are worse than the baseline and the importance of early symptom recognition and reporting to prevent exacerbation which may lead to ED visits and hospital admissions.  I advised patient to contact PCP office if needed.  No further needs at this time.       Lab Results       None            Care Coordination Interventions    Referral from Primary Care Provider: No  Suggested Interventions and Community Resources          Goals Addressed    None         No future appointments.

## 2025-02-18 ENCOUNTER — CARE COORDINATION (OUTPATIENT)
Dept: CARE COORDINATION | Age: 85
End: 2025-02-18

## 2025-02-18 NOTE — CARE COORDINATION
Ambulatory Care Coordination Note     2025 12:21 PM     Patient Current Location:  Home[place] [address] [place] [address]     ACM contacted the patient by telephone. Verified name and  with patient as identifiers.     Patient graduated from the High Risk Care Management program on 2025.  Patient verbalizes confidence in the ability to self-manage at this time. has the ability to self manage at this time..  Care management goals have been completed. No further Ambulatory Care Manager follow up scheduled.      ACM: Zahraa Strickland RN     Challenges to be reviewed by the provider   Additional needs identified to be addressed with provider No  none               Method of communication with provider: none.    Utilization: Patient has not had any utilization since our last call.     Care Summary Note: Eb was referred to care coordination by PAC team following d/c from [facility].   Pt admitted to [facility]  to .  Pt has h/o: A-fib, HTN, depression, HLD, Watchman device.   Admitted to [facility] -.   Pt receives caregiver services 3 days per wk.  Very independent regarding his care.    Pt remains active  Had f/u with PCP at end of .  No changes.   Denies any barriers to care or resource needs at this time.    Offered patient enrollment in the Remote Patient Monitoring (RPM) program for in-home monitoring: Yes, but did not enroll at this time: already monitoring with home equipment.     Assessments Completed:   No changes since last call    Medications Reviewed:   Patient denies any changes with medications and reports taking all medications as prescribed. and Completed during a previous call     Advance Care Planning:   Reviewed and current     Care Planning:   Education Documentation  No documentation found.  Education Comments  No comments found.     ,    Goals Addressed                   This Visit's Progress     COMPLETED: Conditions and Symptoms        I will schedule office